# Patient Record
Sex: MALE | Race: BLACK OR AFRICAN AMERICAN
[De-identification: names, ages, dates, MRNs, and addresses within clinical notes are randomized per-mention and may not be internally consistent; named-entity substitution may affect disease eponyms.]

---

## 2020-04-05 ENCOUNTER — HOSPITAL ENCOUNTER (EMERGENCY)
Dept: HOSPITAL 92 - ERS | Age: 20
Discharge: HOME | End: 2020-04-05
Payer: SELF-PAY
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
## 2020-04-05 DIAGNOSIS — S40.012A: Primary | ICD-10-CM

## 2020-04-05 DIAGNOSIS — J45.909: ICD-10-CM

## 2020-04-05 DIAGNOSIS — F17.210: ICD-10-CM
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
## 2020-04-05 NOTE — RAD
LEFT CLAVICLE TWO VIEWS:

 

History: Clavicular pain. 

 

FINDINGS: 

No signs of fracture or dislocation. The distal end of the clavicle is somewhat prominent. This may b
e the sequellae of an old injury although no fracture is seen. Glenohumeral joint appears unremarkabl
e. 

 

IMPRESSION: 

No acute findings. 

 

POS: GE